# Patient Record
Sex: MALE | Race: BLACK OR AFRICAN AMERICAN
[De-identification: names, ages, dates, MRNs, and addresses within clinical notes are randomized per-mention and may not be internally consistent; named-entity substitution may affect disease eponyms.]

---

## 2017-01-26 ENCOUNTER — HOSPITAL ENCOUNTER (EMERGENCY)
Dept: HOSPITAL 68 - ERH | Age: 32
End: 2017-01-26
Payer: COMMERCIAL

## 2017-01-26 VITALS — BODY MASS INDEX: 24.34 KG/M2 | HEIGHT: 70 IN | WEIGHT: 170 LBS

## 2017-01-26 VITALS — DIASTOLIC BLOOD PRESSURE: 93 MMHG | SYSTOLIC BLOOD PRESSURE: 161 MMHG

## 2017-01-26 DIAGNOSIS — K64.8: Primary | ICD-10-CM

## 2017-01-26 NOTE — ED GI/GU/ABDOMINAL COMPLAINT
History of Present Illness
 
General
Chief Complaint: General Adult
Stated Complaint: PT BLEEDING ALOT WHEN USING THE BATHROOM
Source: patient, old records
Exam Limitations: no limitations
 
Vital Signs & Intake/Output
Vital Signs & Intake/Output
 Vital Signs
 
 
Date Time Temp Pulse Resp B/P Pulse O2 O2 Flow FiO2
 
     Ox Delivery Rate 
 
01/26 1728     97 Room Air  
 
01/26 1640 97.2 98 20 161/93 99 Room Air  
 
 
 ED Intake and Output
 
 
 01/27 0000 01/26 1200
 
Intake Total 0 
 
Output Total  
 
Balance 0 
 
   
 
Intake, IV 0 
 
Patient 170 lb 
 
Weight  
 
 
Allergies
Coded Allergies:
NO KNOWN ALLERGIES (01/26/17)
 
Reconcile Medications
Amoxicillin (Amoxil) 500 MG CAP   1 TAB PO BID DENTAL INFECTION
Anusol Hc (Anusol-Hc) 25 MG SUPP.RECT   1 SUP RC BID HEMORRHOID
Dicyclomine Hydrochloride (Bentyl) 20 MG TAB   1 TAB PO 4 TIMES/DAY PRN 
abdominal pain
 
Triage Note:
TRIAGE: PT TO ER C/C BLOOD FROM RECTUM DURING
BOWEL MOVEMENT TODAY. STATES YESTERDAY HE WAS
CONSTIPATED. REPORTS HX OF HEMMORHOIDS.
Triage Nurses Notes Reviewed? yes
Onset: Abrupt
Duration: day(s): (1), better, resolved prior to arrival
Timing: single episode today
Quality/Severity: aching
Severity Numbers: 3
Location: RECTAL
Radiation: no radiation
Activities at Onset: BOWEL MOVEMENT
No Modifying Factors: none
Associated Symptoms: DENIES
HPI:
31-year-old male presents emergency room for evaluation with history of 
hemorrhoids after he had one episode of bright red blood per rectum while having
a bowel movement today.  Patient reports a history of similar episodes in the 
past for which she was given a cream and the symptoms resolved.  He states that 
yesterday he was constipated as he recently quit smoking and could not go.  He 
states that he has not had any diarrhea nausea vomiting or abdominal pain.  No 
fever no chills.  He first noticed the blood while wiping while having a bowel 
movement today.  He denies any hematuria urgency frequency.  There are no 
modifying factors or associated symptoms otherwise.
 
 
(GRETCHEN BENJAMIN)
 
Past History
 
Travel History
Traveled to [place] past 21 day No
 
Medical History
Any Pertinent Medical History? see below for history
Neurological: NONE
EENT: NONE
Cardiovascular: NONE
Respiratory: NONE
Gastrointestinal: HEMORROIDS
Hepatic: NONE
Renal: NONE
Musculoskeletal: NONE
Psychiatric: NONE
Endocrine: NONE
Blood Disorders: NONE
Cancer(s): NONE
GYN/Reproductive: NONE
 
Surgical History
Surgical History: N
 
Psychosocial History
What is your primary language English
Tobacco Use: Current Daily Use
Daily Tobacco Use Amount/Type: => 5 Cigarettes daily
ETOH Use: occasional use
Illicit Drug Use: denies illicit drug use
 
Family History
Hx Contributory? No
(GRETCHEN BENJAMIN)
 
Review of Systems
 
Review of Systems
Constitutional:
Reports: see HPI. 
All Other Systems: Reviewed and Negative
Comments
Review of systems: See HPI, All other systems negative.
Constitutional, no chills no fever, no malaise
HEENT: No visual changes no sore throat no congestion, no ear pain
Cardiovascular: No chest pain , no palpitation 
Skin, no jaundice no rashes, no change in skin
Respiratory: No dyspnea no cough no  sputum
GI: No nausea no vomiting, no diarrhea, no bloating/constipation
: No dysuria No hematuria, 
Muscle skeletal: No joint pain, no joint swelling, no back pain, no neck pain,
Neurologic: No numbness no headache
Psych: No stress 
Heme/endocrine: No bruising no bleeding 
Immunology: No lymphadenopathy
(GRETCHEN BENJAMIN)
 
Physical Exam
 
Physical Exam
General Appearance: well developed/nourished, alert, awake
Gastrointestinal: normal bowel sounds, soft, non-tender
Comments:
Well-developed well-nourished person in no acute distress
HEENT: Normal EENT exam; PERRL, EOMI, no nystagmus. HEAD is atraumatic. moist 
mucous membranes.  
Neck: Supple, normal range of motion
Back: Nontender. Full range of motion
Cardiovascular: Regular rate and rhythms no murmurs rubs 
Respiratory: No respiratory distress.  Patient speaking in full complete 
sentences. Breath sounds clear to auscultation bilaterally: NO W/R/R
Abdomen: Soft, nontender nondistended, no appreciable organomegaly. Normal bowel
sounds. No rebound/guarding
Rectal: Nontender.  Brown stool heme positive, positive internal hemorrhoid, no 
fissure. 
Extremity: No edema, full range of motion of extremities, normal and equal 
pulses bilaterally, 5 out of 5 strength noted to bilateral upper and lower 
extremities
Neuro: Alert oriented x3, motor sensory normal, There were no obvious focal 
neurologic abnormalities.
Skin: No appreciable rash on exposed skin, skin is warm and dry.
Psych: Mood and affect is normal, memory and judgment is normal.
 
Core Measures
ACS in differential dx? No
Severe Sepsis Present: No
Septic Shock Present: No
(GRETCHEN BENJAMIN)
 
Progress
Differential Diagnosis: colon cancer, gastritis, hepatitis, hernia, hemorrhoids,
ischemic bowel, inflamm bowel dis, peptic ulcer, PUD/GERD, perforated viscous
Plan of Care:
I discussed the patient's physical exam findings given history of similar 
presentation and finding of internal hemorrhoids today discussed with him need 
for close follow-up with his primary care physician prescription for ANUSOL 
suppository provided I advised over-the-counter Dulcolax and stool softeners as 
needed return anytime sooner with any concerns.  The patient feels comfortable 
plan he will return anytime sooner with any concerns cleared for discharge
Initial ED EKG: none
(GRETCHEN BENJAMIN)
 
Departure
 
Departure
Time of Disposition: 1721
Disposition: HOME OR SELF CARE
Condition: Stable
Clinical Impression
Primary Impression: Internal hemorrhoid
Referrals:
KRISTAN VALDOVINOS DO,INEZ HO (PCP/Family)
 
Additional Instructions:
ANUSOL SUPPOSITORY AS DIRECTED. USE OVER THE COUNTER STOOL SOFTENERS AS 
DISCUSSED,DRINK PLENTY OF FLUIDS. FOLLOW UP WITH WITH COLORECTAL SURGEON DR RUSHING IF SYMPTOMS PERSIST.RETURN AT ANYTIME SOONER WITH ANY CONCERNS
Departure Forms:
Customer Survey
General Discharge Information
Prescriptions:
Current Visit Scripts
Anusol Hc (Anusol-Hc) 1 SUP RC BID 
     #14 SUP 
 
 
(GRETCHEN BENJAMIN)
 
PA/NP Co-Sign Statement
Statement:
ED Attending supervision documentation-
 
[] I saw and evaluated the patient. I have also reviewed all the pertinent lab 
results and diagnostic results. I agree with the findings and the plan of care 
as documented in the PA's/NP's documentation. 
 
[X] I have reviewed the ED Record and agree with the PA's/NP's documentation.
 
[] Additions or exceptions (if any) to the PAs/NP's note and plan are 
summarized below:
[]
 
(ISABELA YIP,FAVIO)

## 2018-07-07 ENCOUNTER — HOSPITAL ENCOUNTER (EMERGENCY)
Dept: HOSPITAL 68 - ERH | Age: 33
End: 2018-07-07
Payer: COMMERCIAL

## 2018-07-07 VITALS — SYSTOLIC BLOOD PRESSURE: 128 MMHG | DIASTOLIC BLOOD PRESSURE: 82 MMHG

## 2018-07-07 VITALS — HEIGHT: 70 IN | WEIGHT: 183 LBS | BODY MASS INDEX: 26.2 KG/M2

## 2018-07-07 DIAGNOSIS — K04.7: Primary | ICD-10-CM

## 2018-07-07 NOTE — ED THROAT/DENTAL COMPLAINT
History of Present Illness
 
General
Chief Complaint: General Adult
Stated Complaint: SWELLING IN MOUTH, LEFT EAR PAIN
Source: patient
Exam Limitations: no limitations
 
Vital Signs & Intake/Output
Vital Signs & Intake/Output
 Vital Signs
 
 
Date Time Temp Pulse Resp B/P B/P Pulse O2 O2 Flow FiO2
 
     Mean Ox Delivery Rate 
 
 1353 98.0 60 20 128/82  99 Room Air  
 
 1140 97.9 58 18 127/88  97 Room Air  
 
 0950  66 20 116/78  98 Room Air  
 
 
 ED Intake and Output
 
 
  0000  1200
 
Intake Total  
 
Output Total  
 
Balance  
 
   
 
Patient  183 lb
 
Weight  
 
Weight  Reported by Patient
 
Measurement  
 
Method  
 
 
 
Allergies
Coded Allergies:
No Known Allergies (18)
 
Reconcile Medications
Amoxicillin 500 MG TABLET   1 TAB PO BID infection
 
Triage Note:
PT C/O SWELLING TO ROOF OF MOUTH AND LEFT EAR PAIN
 X 2 DAYS. DENIES AIRWAY INVOLVEMENT
Triage Nurses Notes Reviewed? yes
Onset: Abrupt
Duration: changing over time, continues in ED, getting worse
Timing: no prior history
Severity: moderate
Severity Numbers: 9
No Modifying Factors: none
HPI:
33-year-old male presents emergency department reporting 2 day history of left 
oral pain that is now radiating to his left ear.  He reports that he had looked 
at the area and seeing a "black dot" which have concerned him.  He does report a
history of having tooth infection on his lower left oral region which occurred 
years ago.  He denies any trauma to the area.  He denies taking any pain 
medication for this.  He does state that he was supposed to work in [place] 
today but because of the ongoing pain he was unable to go.  He reports having 
difficulty swallowing because of this.  He does report having twitching on his 
right eye which he is not sure if this is related to.  He denies any significant
medical history.  He does report smoking cigarettes which he discontinued about 
3 weeks ago.
(Jazlyn Acevedo)
 
Past History
 
Travel History
Traveled to [place] past 21 day No
 
Medical History
Any Pertinent Medical History? see below for history
Neurological: NONE
EENT: NONE
Cardiovascular: NONE
Respiratory: NONE
Gastrointestinal: HEMORROIDS
Hepatic: NONE
Renal: NONE
Musculoskeletal: NONE
Psychiatric: NONE
Endocrine: NONE
Blood Disorders: NONE
Cancer(s): NONE
GYN/Reproductive: NONE
 
Surgical History
Surgical History: N
 
Psychosocial History
What is your primary language English
Tobacco Use: Current Daily Use
Daily Tobacco Use Amount/Type: => 5 Cigarettes daily
ETOH Use: occasional use
Illicit Drug Use: denies illicit drug use
 
Family History
Hx Contributory? No
(Jazlyn Acevedo)
 
Review of Systems
 
Review of Systems
Constitutional:
Reports: no symptoms. 
EENTM:
Reports: see HPI. 
Respiratory:
Reports: no symptoms. 
Cardiovascular:
Reports: no symptoms. 
GI:
Reports: no symptoms. 
Genitourinary:
Reports: no symptoms. 
Musculoskeletal:
Reports: no symptoms. 
Skin:
Reports: no symptoms. 
Neurological/Psychological:
Reports: no symptoms. 
Hematologic/Endocrine:
Reports: no symptoms. 
Immunologic/Allergic:
Reports: no symptoms. 
All Other Systems: Reviewed and Negative
(Jazlyn Acevedo)
 
Physical Exam
 
Physical Exam
General Appearance: well developed/nourished, no apparent distress, alert, awake
, comfortable
Head: atraumatic, normal appearance
Eyes:
Bilateral: normal appearance, EOMI. 
Ears:
Bilateral: canal normal, Tympanic normal. 
Nose: normal inspection, no sinus tenderness
Mouth/Throat: uvular deviation to the right, left upper soft palate with 
amorphous 1cm area of hyperpigmentation, tender to touch, no tonsillar exudates 
or swelling, no foreign body, no tenderness to palpation of entire oral mucosa 
other than area mentioned
Neck: normal inspection, supple, full range of motion, trachea midline, no 
midline tenderness, no lymphadenopathy
Cardiovascular/Respiratory: no respiratory distress
Back: normal range of motion
Neurologic/Psych: no motor/sensory deficits, awake, alert, oriented x 3, normal 
gait, normal mood/affect
Skin: intact, normal color, warm/dry
 
Diagram
Dental:
 
  1) 
 
Core Measures
ACS in differential dx? No
Sepsis Present: No
Sepsis Focused Exam Completed? No
(Jazlyn Acevedo)
 
Progress
Differential Diagnosis: odontogenic abscess, mignon-tonsillar abscess, stomatitis/
gingivitis
Plan of Care:
 Orders
 
 
Procedure Date/time Status
 
CT NECK W IV CONTRAST 1121 Active
 
CT MAXILLOFACIAL W CONT 1121 Active
 
 
33 year old male with 2 day history of left upper oral pain with "black dot" on 
the oral mucosa, with pain radiating towards his left ear. 
-Ordered CT scans of neck and maxillofacial - radiology called and requesting to
order just the CT neck as this will show what we are looking for.
-CT scans negative. Patient given abx rx to take at home for prophylactic 
prevention of infection.  He is advised to follow-up with dentist on Monday 
outpatient.  He should return to the emergency department with any new or 
worsening symptoms.  Patient was stable at time of discharge.
Diagnostic Imaging:
Viewed by Me: CT Scan.  Discussed w/RAD: CT Scan. 
Radiology Impression: PATIENT: GISELA AVENDANO  MEDICAL RECORD NO: [id number] 
PRESENT AGE: 33  PATIENT ACCOUNT NO: [id number] : 83  LOCATION: [facility] 
ORDERING PHYSICIAN: Jazlyn MOHR     SERVICE DATE:  EXAM TYPE: 
CAT - CT NECK W IV CONTRAST EXAMINATION: CT NECK WITH CONTRAST CLINICAL 
INFORMATION: Left oral pain. Evaluate for peritonsillar abscess. COMPARISON: 
None TECHNIQUE: Multidetector helical imaging was performed in the axial plane 
following intravenous administration of 90 mL of Optiray 320. DLP: 53.9 mGy-cm 
FINDINGS: No contour abnormality or pathologic enhancement seen within the oral 
cavity or pharyngeal mucosal space. No peritonsillar fluid collection is seen. 
No retropharyngeal fluid collection is identified. The larynx appears normal. 
The airway is patent. The thyroid gland is homogeneous. The carotid sheath 
vasculature opacifies normally. There is elongation of the styloid processes 
bilaterally with ossification of the stylohyoid ligaments extending to the hyoid
bone. There is mild mucosal thickening in the maxillary sinuses and mild to 
moderate mucosal thickening in the right sphenoid sinus. The mastoid air cells 
are clear. There is no cervical adenopathy. The TMJs are normal. The imaged 
orbits are unremarkable. There is periapical lucency and erosion of the buccal 
cortex overlying the tooth root of the right lateral mandibular incisor. The 
right first mandibular molar tooth is been previously extracted. There is a 
small left paracentral disc protrusion at C4-C5. Mild spondylosis visible at C5-
C6. The submandibular and parotid glands appear normal. The  spaces 
are symmetric. The retromolar trigones and not well assessed due to dental 
amalgam artifacts. The imaged mediastinum is normal. The lung apices are clear. 
The imaged portions of the brain parenchyma demonstrate no acute abnormality. 
IMPRESSION: No peritonsillar abscess. Mild to moderate mucosal thickening in the
right sphenoid sinus. Elongation of the styloid processes bilaterally with 
ossification of the stylohyoid ligaments. Age indeterminate periapical lucency 
and erosion of the buccal cortex in the alveolar bone overlying the tooth root 
of the right lateral mandibular incisor. DICTATED BY: Ed Sibley MD  DATE/
TIME DICTATED:18 :[facility]  DATE/TIME TRANSCRIBED:
18 CONFIDENTIAL, DO NOT COPY WITHOUT APPROPRIATE AUTHORIZATION.  <
Electronically signed in Other Vendor System>                                   
                                                    SIGNED BY: Ed Sibley MD 18 1211
(Jazlyn Acevedo)
 
Departure
 
Departure
Disposition: HOME OR SELF CARE
Condition: Stable
Clinical Impression
Primary Impression: Dental abscess
Referrals:
Valerie Caicedo (PCP/Family)
 
Additional Instructions:
Take amoxicillin as prescribed.  Follow-up with dentist, call on Monday for 
appointment.  Return to the emergency department with new or worsening symptoms.
Departure Forms:
Customer Survey
General Discharge Information
Prescriptions:
Current Visit Scripts
Amoxicillin 1 TAB PO BID  
     #24 TAB 
 
 
(Jazlyn Acevedo)
 
PA/NP Co-Sign Statement
Statement:
ED Attending supervision documentation-
 
 I saw and evaluated the patient. I have also reviewed all the pertinent lab 
results and diagnostic results. I agree with the findings and the plan of care 
as documented in the PA's/NP's documentation. 
 
x I have reviewed the ED Record and agree with the PA's/NP's documentation.
 
[] Additions or exceptions (if any) to the PAs/NP's note and plan are 
summarized below:
[]
 
(Jade YIP,Uziel)

## 2018-07-07 NOTE — CT SCAN REPORT
EXAMINATION:
CT NECK WITH CONTRAST
 
CLINICAL INFORMATION:
Left oral pain. Evaluate for peritonsillar abscess.
 
COMPARISON:
None
 
TECHNIQUE:
Multidetector helical imaging was performed in the axial plane following
intravenous administration of 90 mL of Optiray 320.
 
DLP:
53.9 mGy-cm
 
FINDINGS:
No contour abnormality or pathologic enhancement seen within the oral cavity
or pharyngeal mucosal space. No peritonsillar fluid collection is seen. No
retropharyngeal fluid collection is identified. The larynx appears normal.
The airway is patent. The thyroid gland is homogeneous. The carotid sheath
vasculature opacifies normally.
 
There is elongation of the styloid processes bilaterally with ossification of
the stylohyoid ligaments extending to the hyoid bone. There is mild mucosal
thickening in the maxillary sinuses and mild to moderate mucosal thickening
in the right sphenoid sinus. The mastoid air cells are clear. There is no
cervical adenopathy.
 
The TMJs are normal. The imaged orbits are unremarkable. There is periapical
lucency and erosion of the buccal cortex overlying the tooth root of the
right lateral mandibular incisor. The right first mandibular molar tooth is
been previously extracted. There is a small left paracentral disc protrusion
at C4-C5. Mild spondylosis visible at C5-C6.
 
The submandibular and parotid glands appear normal. The  spaces are
symmetric. The retromolar trigones and not well assessed due to dental
amalgam artifacts. The imaged mediastinum is normal. The lung apices are
clear. The imaged portions of the brain parenchyma demonstrate no acute
abnormality.
 
IMPRESSION:
No peritonsillar abscess. Mild to moderate mucosal thickening in the right
sphenoid sinus.
Elongation of the styloid processes bilaterally with ossification of the
stylohyoid ligaments.
 
Age indeterminate periapical lucency and erosion of the buccal cortex in the
alveolar bone overlying the tooth root of the right lateral mandibular
incisor.